# Patient Record
Sex: FEMALE | Employment: UNEMPLOYED | URBAN - METROPOLITAN AREA
[De-identification: names, ages, dates, MRNs, and addresses within clinical notes are randomized per-mention and may not be internally consistent; named-entity substitution may affect disease eponyms.]

---

## 2018-03-01 ENCOUNTER — OFFICE VISIT (OUTPATIENT)
Dept: FAMILY PLANNING/WOMEN'S HEALTH CLINIC | Age: 58
End: 2018-03-01

## 2018-03-01 ENCOUNTER — HOSPITAL ENCOUNTER (OUTPATIENT)
Dept: MAMMOGRAPHY | Age: 58
Discharge: HOME | End: 2018-03-01

## 2018-03-01 VITALS — DIASTOLIC BLOOD PRESSURE: 82 MMHG | SYSTOLIC BLOOD PRESSURE: 140 MMHG

## 2018-03-01 DIAGNOSIS — Z12.31 VISIT FOR SCREENING MAMMOGRAM: ICD-10-CM

## 2018-03-01 DIAGNOSIS — Z01.419 ENCOUNTER FOR WELL WOMAN EXAM: Primary | ICD-10-CM

## 2018-03-01 PROCEDURE — 77067 SCR MAMMO BI INCL CAD: CPT

## 2018-03-01 RX ORDER — SUMATRIPTAN 100 MG/1
100 TABLET, FILM COATED ORAL
COMMUNITY

## 2018-03-01 RX ORDER — NORTRIPTYLINE HYDROCHLORIDE 25 MG/1
CAPSULE ORAL
COMMUNITY

## 2018-03-01 NOTE — PROGRESS NOTES
Assessment/Plan:    Diagnoses and all orders for this visit:    1. Encounter for well woman exam        Follow-up Disposition: Not on File    [address] KISHA Hyman expressed understanding of this plan. An AVS was printed and given to the patient.      ----------------------------------------------------------------------    Chief Complaint   Patient presents with    Well Woman     EWL visit-breast only       History of Present Illness:     Hysterectomy in  for heavy menstrual bleeding  One pap since her hysterectomy  Last mammo? One in our system from   No breast concerns  She does SBE  She denies any risk for DV        No past medical history on file. Current Outpatient Prescriptions   Medication Sig Dispense Refill    nortriptyline (PAMELOR) 25 mg capsule Take  by mouth nightly.  SUMAtriptan (IMITREX) 100 mg tablet Take 100 mg by mouth once as needed for Migraine. No Known Allergies    Social History   Substance Use Topics    Smoking status: Current Every Day Smoker    Smokeless tobacco: Never Used    Alcohol use Not on file       No family history on file.     Physical Exam:     Visit Vitals    /82       A&Ox3  WDWN NAD  Respirations normal and non labored  Breast exam- wil neg for mass, dimpling, retractions, skin color changes, nipple changes or tenderness

## 2018-03-20 ENCOUNTER — HOSPITAL ENCOUNTER (OUTPATIENT)
Dept: MAMMOGRAPHY | Age: 58
Discharge: HOME | End: 2018-03-20

## 2018-03-20 DIAGNOSIS — R92.8 ABNORMAL MAMMOGRAM: ICD-10-CM

## 2018-03-20 PROCEDURE — 77065 DX MAMMO INCL CAD UNI: CPT

## 2018-03-29 ENCOUNTER — OFFICE VISIT (OUTPATIENT)
Dept: SURGERY | Age: 58
End: 2018-03-29

## 2018-03-29 ENCOUNTER — HOSPITAL ENCOUNTER (OUTPATIENT)
Dept: LAB | Age: 58
Discharge: HOME | End: 2018-03-29

## 2018-03-29 ENCOUNTER — DOCUMENTATION ONLY (OUTPATIENT)
Dept: SURGERY | Age: 58
End: 2018-03-29

## 2018-03-29 ENCOUNTER — HOSPITAL ENCOUNTER (OUTPATIENT)
Dept: MAMMOGRAPHY | Age: 58
Discharge: HOME | End: 2018-03-29
Attending: SURGERY

## 2018-03-29 VITALS
HEART RATE: 91 BPM | HEIGHT: 60 IN | BODY MASS INDEX: 29.84 KG/M2 | DIASTOLIC BLOOD PRESSURE: 88 MMHG | SYSTOLIC BLOOD PRESSURE: 145 MMHG | WEIGHT: 152 LBS

## 2018-03-29 DIAGNOSIS — R92.8 ABNORMAL MAMMOGRAM: ICD-10-CM

## 2018-03-29 DIAGNOSIS — R92.1 BREAST CALCIFICATIONS: Primary | ICD-10-CM

## 2018-03-29 DIAGNOSIS — R92.1 BREAST CALCIFICATIONS: ICD-10-CM

## 2018-03-29 PROCEDURE — 77030030538 MAM STEREO VAC  BX BREAST RT 1ST LESION W/CLIP AND SPECIMEN

## 2018-03-29 PROCEDURE — 77065 DX MAMMO INCL CAD UNI: CPT

## 2018-03-29 NOTE — MR AVS SNAPSHOT
[facility] [address] [place] [address] [facility] 
[phone] Patient: Kiran Mann MRN: [id number] KDU:[id number] Visit Information Date & Time Provider Department Dept. Phone Encounter #  
 3/29/2018  9:30 AM Nixon Doshi MD [facility] [phone] [id number] Upcoming Health Maintenance Date Due Hepatitis C Screening 1960 Pneumococcal 19-64 Medium Risk (1 of 1 - PPSV23) 3/25/1979 DTaP/Tdap/Td series (1 - Tdap) 3/25/1981 PAP AKA CERVICAL CYTOLOGY 3/25/1981 FOBT Q 1 YEAR AGE 50-75 3/25/2010 Influenza Age 5 to Adult 8/1/2017 BREAST CANCER SCRN MAMMOGRAM 3/20/2020 Allergies as of 3/29/2018  Review Complete On: 3/29/2018 By: Zayda Kothari RN No Known Allergies Current Immunizations  Never Reviewed No immunizations on file. Not reviewed this visit You Were Diagnosed With   
  
 Codes Comments Breast calcifications    -  Primary ICD-10-CM: R92.1 ICD-9-CM: 793.89 Abnormal mammogram     ICD-10-CM: R92.8 ICD-9-CM: 793.80 Vitals BP Pulse Height(growth percentile) Weight(growth percentile) BMI OB Status 145/88 91 5' (1.524 m) 152 lb (68.9 kg) 29.69 kg/m2 Hysterectomy Smoking Status Current Every Day Smoker BMI and BSA Data Body Mass Index Body Surface Area  
 29.69 kg/m 2 1.71 m 2 Your Updated Medication List  
  
   
This list is accurate as of 3/29/18  3:35 PM.  Always use your most recent med list.  
  
  
  
  
 IMITREX 100 mg tablet Generic drug:  SUMAtriptan Take 100 mg by mouth once as needed for Migraine. nortriptyline 25 mg capsule Commonly known as:  PAMELOR Take  by mouth nightly. We Performed the Following SURGICAL PATHOLOGY [KOF7073 Custom] To-Do List   
 03/29/2018   Imaging:  ELIZABETH STEREO VAC  BX BREAST RT 1ST LESION W/CLIP AND SPECIMEN   
  
  
 Patient Instructions Stereotactic Breast Biopsy: About This Test 
What is it? Stereotactic breast biopsy is a test that uses imaging, such as X-ray, to find an area of your breast where a tissue sample will be taken. The sample is looked at under a microscope to check for signs of breast cancer. Why is this test done? This type of breast biopsy is usually done to check for cancer in a lump found during a mammogram. 
How can you prepare for the test? 
Talk to your doctor about all your health conditions before the test. For example, tell your doctor if you: · Are taking any medicines. · Are allergic to any medicines. · Are allergic to latex. · Have had bleeding problems, or if you take aspirin or some other blood thinner. · Are or might be pregnant. · Have a problem with lying on your stomach for 30 to 40 minutes. What happens before the test? 
· You will take off your clothing above the waist. A paper or cloth gown will cover your shoulders. · Your skin is washed with a special soap. · You will be given a shot of medicine to numb the biopsy area on your breast. 
· Images are taken to find the exact site for the biopsy. What happens during the test? 
· You will lie on your stomach on a table that has a hole for your breast to hang through. · Once the area in your breast is numb, a small cut (incision) is made in the skin. · Using the imaging, the doctor will guide the needle into the biopsy area. · A sample of breast tissue is taken through the needle. · A small clip is usually inserted into your breast to spenser the biopsy site. · The needle is removed and pressure put on the needle site to stop any bleeding. · A bandage is put on the needle site. What else should you know about the test? 
· The small cut for the needle does not usually need stitches. How long does the test take? · The test will take about 60 minutes.  Most of the time is spent preparing for the images and finding the area for the biopsy. What happens after the test? 
· You'll be told how long it may take to get your results back. · You will probably be able to go home right away. · You can go back to your usual activities right away, but avoid heavy lifting for 24 hours. · The site may be tender for 2 or 3 days. You may also have some bruising, swelling, or slight bleeding. ¨ You can use an ice pack. Put ice or a cold pack on the area for 10 to 20 minutes at a time. Put a thin cloth between the ice and your skin. ¨ Ask your doctor if you can take an over-the-counter pain medicine, such as acetaminophen (Tylenol), ibuprofen (Advil, Motrin), or naproxen (Aleve). Be safe with medicines. Read and follow all instructions on the label. · After a specialist looks at the biopsy sample for signs of cancer, your doctor's office will let you know the results. · If the test results are not clear, you may have another biopsy or test. 
Follow-up care is a key part of your treatment and safety. Be sure to make and go to all appointments, and call your doctor if you are having problems. It's also a good idea to keep a list of the medicines you take. Ask your doctor when you can expect to have your test results. Where can you learn more? Go to http://cyril-alina.info/. Enter Y286 in the search box to learn more about \"Stereotactic Breast Biopsy: About This Test.\" Current as of: May 12, 2017 Content Version: 11.4 © 0175-8469 [employer]. Care instructions adapted under license by [employer] (which disclaims liability or warranty for this information). If you have questions about a medical condition or this instruction, always ask your healthcare professional. [address] any warranty or liability for your use of this information. Stereotactic Breast Biopsy: About This Test 
What is it? Stereotactic breast biopsy is a test that uses imaging, such as X-ray, to find an area of your breast where a tissue sample will be taken. The sample is looked at under a microscope to check for signs of breast cancer. Why is this test done? This type of breast biopsy is usually done to check for cancer in a lump found during a mammogram. 
How can you prepare for the test? 
Talk to your doctor about all your health conditions before the test. For example, tell your doctor if you: · Are taking any medicines. · Are allergic to any medicines. · Are allergic to latex. · Have had bleeding problems, or if you take aspirin or some other blood thinner. · Are or might be pregnant. · Have a problem with lying on your stomach for 30 to 40 minutes. What happens before the test? 
· You will take off your clothing above the waist. A paper or cloth gown will cover your shoulders. · Your skin is washed with a special soap. · You will be given a shot of medicine to numb the biopsy area on your breast. 
· Images are taken to find the exact site for the biopsy. What happens during the test? 
· You will lie on your stomach on a table that has a hole for your breast to hang through. · Once the area in your breast is numb, a small cut (incision) is made in the skin. · Using the imaging, the doctor will guide the needle into the biopsy area. · A sample of breast tissue is taken through the needle. · A small clip is usually inserted into your breast to spenser the biopsy site. · The needle is removed and pressure put on the needle site to stop any bleeding. · A bandage is put on the needle site. What else should you know about the test? 
· The small cut for the needle does not usually need stitches. How long does the test take? · The test will take about 60 minutes. Most of the time is spent preparing for the images and finding the area for the biopsy.  
What happens after the test? 
 · You'll be told how long it may take to get your results back. · You will probably be able to go home right away. · You can go back to your usual activities right away, but avoid heavy lifting for 24 hours. · The site may be tender for 2 or 3 days. You may also have some bruising, swelling, or slight bleeding. ¨ You can use an ice pack. Put ice or a cold pack on the area for 10 to 20 minutes at a time. Put a thin cloth between the ice and your skin. ¨ Ask your doctor if you can take an over-the-counter pain medicine, such as acetaminophen (Tylenol), ibuprofen (Advil, Motrin), or naproxen (Aleve). Be safe with medicines. Read and follow all instructions on the label. · After a specialist looks at the biopsy sample for signs of cancer, your doctor's office will let you know the results. · If the test results are not clear, you may have another biopsy or test. 
Follow-up care is a key part of your treatment and safety. Be sure to make and go to all appointments, and call your doctor if you are having problems. It's also a good idea to keep a list of the medicines you take. Ask your doctor when you can expect to have your test results. Where can you learn more? Go to http://cyril-alina.info/. Enter Y286 in the search box to learn more about \"Stereotactic Breast Biopsy: About This Test.\" Current as of: May 12, 2017 Content Version: 11.4 © 2331-9300 [employer]. Care instructions adapted under license by [employer] (which disclaims liability or warranty for this information). If you have questions about a medical condition or this instruction, always ask your healthcare professional. [place] [address] any warranty or liability for your use of this information. Introducing [facility]!    
 [facility] [email or web address] introduces Weplay patient portal. Now you can access parts of your medical record, email your doctor's office, and request medication refills online. 1. In your internet browser, go to [email or web address] 2. Click on the First Time User? Click Here link in the Sign In box. You will see the New Member Sign Up page. 3. Enter your Gnzo Access Code exactly as it appears below. You will not need to use this code after youve completed the sign-up process. If you do not sign up before the expiration date, you must request a new code. · Gnzo Access Code: [email or web address] Expires: 5/30/2018 12:12 PM 
 
4. Enter the last four digits of your Social Security Number (xxxx) and Date of Birth (mm/dd/yyyy) as indicated and click Submit. You will be taken to the next sign-up page. 5. Create a Gnzo ID. This will be your Gnzo login ID and cannot be changed, so think of one that is secure and easy to remember. 6. Create a Gnzo password. You can change your password at any time. 7. Enter your Password Reset Question and Answer. This can be used at a later time if you forget your password. 8. Enter your e-mail address. You will receive e-mail notification when new information is available in [address] 9. Click Sign Up. You can now view and download portions of your medical record. 10. Click the Download Summary menu link to download a portable copy of your medical information. If you have questions, please visit the Frequently Asked Questions section of the Gnzo website. Remember, Gnzo is NOT to be used for urgent needs. For medical emergencies, dial 911. Now available from your iPhone and Android! Please provide this summary of care documentation to your next provider. Your primary care clinician is listed as Selvin Del Toro. If you have any questions after today's visit, please call [phone].

## 2018-03-29 NOTE — PROGRESS NOTES
HISTORY OF PRESENT ILLNESS  Yumiko Montero is a 62 y.o. female. HPI  NEW patient consult referred by Dr. Beatrice Camarillo for abnormal RIGHT breast mammogram.  Denies palpable lump, skin changes, or nipple discharge/retraction. Has some pain to her RIGHT breast that comes and goes. Does take care of her grandchildren so not sure if it from them jumping on her. Obstetric History       T0      L3     SAB0   TAB0   Ectopic0   Molar0   Multiple0   Live Births0    Obstetric Comments   Menarche:  10. LMP: 39.  # of Children:  3. Age at Delivery of First Child:  16.   Hysterectomy/oophorectomy:  YES/NO. Breast Bx:  no.  Hx of Breast Feeding:  no. BCP:  no. Hormone therapy:  no.      FH:  Mother is a survivor of breast cancer, diagnosed at 66. Sister is a survivor of breast cancer, diagnosed at 39. Pt has one unaffected sister. Maternal first cousin is a patient of Dr. Wanda Keating, 61, having treatment now. Mammogram, 3/20/18, BIRADS 4B    Review of Systems   Constitutional: Negative. HENT: Negative. Eyes: Negative. Respiratory: Positive for cough. Cardiovascular: Negative. Gastrointestinal: Positive for diarrhea. Genitourinary: Negative. Musculoskeletal: Negative. Skin: Negative. Neurological: Negative. Endo/Heme/Allergies: Negative. Psychiatric/Behavioral: Negative. Physical Exam   Constitutional: She is oriented to person, place, and time. She appears well-developed and well-nourished. Cardiovascular: Normal rate, regular rhythm and normal heart sounds. Pulmonary/Chest: Effort normal and breath sounds normal. Right breast exhibits no mass, no nipple discharge and no skin change. Left breast exhibits no mass, no nipple discharge and no skin change. Breasts are symmetrical.   Lymphadenopathy:     She has no cervical adenopathy. She has no axillary adenopathy. Right: No supraclavicular adenopathy present.         Left: No supraclavicular adenopathy present. Neurological: She is alert and oriented to person, place, and time. Skin: Skin is warm and dry. Stereotactic Biopsy  PROCEDURE : LORAD STEREOTACTIC VACUUM ASSISTED BIOPSY AND RELATED DIGITAL MAMMOGRAPHY OF THE, Right BREAST. INDICATION : MICROCALCIFICATIONS. CONSENT : Following a detailed description of the LORAD stereotactic core biopsy procedure, its risks, benefits and possible alternatives (open biopsy), the patient signed the informed consent. The risks and benefits of the procedure have been explained to the patient by the stereotactic technologist and Dr. Gentry Zendejas. IMAGING : Prebiopsy digital stereotactic imaging of the breast was obtained and confirmed the presence of the abnormality in both  and stereotactic views. The Right breast was imaged. PROJECTION : CC above  Z 21mm/52mm. BIOPSY PROCEDURE : Following sterile preparation of the skin, 1% lidocaine was used as a local anesthetic. A 4mm skin incision was made for the entry site of the biopsy needle. Prefire stereotactic images were obtained to confirm accurate targeting. A 9 gauge Suros needle was used for the biopsy. 6 biopsy specimens were obtained. CLIP PLACEMENT : A clip was placed for future mammographic reference and position was confirmed with a 0 degree postfire image. SPECIMEN RADIOGRAPHY : Digital spot mammography of the core biopsy specimens demonstrated microcalcifications corresponding to the targeted calcifications. TOLERANCE:  Pt tolerated the procedure with no discomfort. FINAL PATHOLOGY :Right breast, core biopsy:       Fragments of benign breast tissue with intraductal and stromal  microcalcifications. POST BIOPSY MAMMOGRAM : CLIP IN GOOD POSITION. CONCORDANCE:  yes  ASSESSMENT and PLAN    ICD-10-CM ICD-9-CM    1. Breast calcifications R92.1 793.89    2.  Abnormal mammogram R92.8 793.80 ELIZABETH STEREO VAC  BX BREAST RT 1ST LESION W/CLIP AND SPECIMEN     RIGHT breast new microcalcifications biopsied    Called patient  Biopsy is benign  Resume yearly mammograms

## 2018-03-29 NOTE — PROGRESS NOTES
[facility]      Chart reviewed for the following:   Brisa Mcmanus MD , have reviewed the History, Physical and updated the Allergic reactions for Marley Lua performed immediately prior to start of procedure:   Brisa Mcmanus MD, have performed the following reviews on Cristiano Moffett prior to the start of the procedure:            * Patient was identified by name and date of birth   * Agreement on procedure being performed was verified  * Risks and Benefits explained to the patient  * Procedure site verified and marked as necessary  * Patient was positioned for comfort  * Consent was signed and verified     Time: 10:34am      Date of procedure: 3/29/2018    Procedure performed by:  Ulysses Gallus, MD    Provider assisted by: Gael Zapien RN    Patient assisted by: Gael Zapien RN    How tolerated by patient: Patient tolerated the procedure well without complications. Denies pain post biopsy    Post Procedural Pain Scale: 0/10    Comments: Written and verbal post biopsy instructions reviewed with and given to patient with her understanding.

## 2018-03-29 NOTE — PATIENT INSTRUCTIONS
Stereotactic Breast Biopsy: About This Test  What is it? Stereotactic breast biopsy is a test that uses imaging, such as X-ray, to find an area of your breast where a tissue sample will be taken. The sample is looked at under a microscope to check for signs of breast cancer. Why is this test done? This type of breast biopsy is usually done to check for cancer in a lump found during a mammogram.  How can you prepare for the test?  Talk to your doctor about all your health conditions before the test. For example, tell your doctor if you:  · Are taking any medicines. · Are allergic to any medicines. · Are allergic to latex. · Have had bleeding problems, or if you take aspirin or some other blood thinner. · Are or might be pregnant. · Have a problem with lying on your stomach for 30 to 40 minutes. What happens before the test?  · You will take off your clothing above the waist. A paper or cloth gown will cover your shoulders. · Your skin is washed with a special soap. · You will be given a shot of medicine to numb the biopsy area on your breast.  · Images are taken to find the exact site for the biopsy. What happens during the test?  · You will lie on your stomach on a table that has a hole for your breast to hang through. · Once the area in your breast is numb, a small cut (incision) is made in the skin. · Using the imaging, the doctor will guide the needle into the biopsy area. · A sample of breast tissue is taken through the needle. · A small clip is usually inserted into your breast to spenser the biopsy site. · The needle is removed and pressure put on the needle site to stop any bleeding. · A bandage is put on the needle site. What else should you know about the test?  · The small cut for the needle does not usually need stitches. How long does the test take? · The test will take about 60 minutes. Most of the time is spent preparing for the images and finding the area for the biopsy.   What happens after the test?  · You'll be told how long it may take to get your results back. · You will probably be able to go home right away. · You can go back to your usual activities right away, but avoid heavy lifting for 24 hours. · The site may be tender for 2 or 3 days. You may also have some bruising, swelling, or slight bleeding. ¨ You can use an ice pack. Put ice or a cold pack on the area for 10 to 20 minutes at a time. Put a thin cloth between the ice and your skin. ¨ Ask your doctor if you can take an over-the-counter pain medicine, such as acetaminophen (Tylenol), ibuprofen (Advil, Motrin), or naproxen (Aleve). Be safe with medicines. Read and follow all instructions on the label. · After a specialist looks at the biopsy sample for signs of cancer, your doctor's office will let you know the results. · If the test results are not clear, you may have another biopsy or test.  Follow-up care is a key part of your treatment and safety. Be sure to make and go to all appointments, and call your doctor if you are having problems. It's also a good idea to keep a list of the medicines you take. Ask your doctor when you can expect to have your test results. Where can you learn more? Go to http://cyril-alina.info/. Enter Y286 in the search box to learn more about \"Stereotactic Breast Biopsy: About This Test.\"  Current as of: May 12, 2017  Content Version: 11.4  © 5270-3629 [employer]. Care instructions adapted under license by [employer] (which disclaims liability or warranty for this information). If you have questions about a medical condition or this instruction, always ask your healthcare professional. [place] [address] any warranty or liability for your use of this information. Stereotactic Breast Biopsy: About This Test  What is it?   Stereotactic breast biopsy is a test that uses imaging, such as X-ray, to find an area of your breast where a tissue sample will be taken. The sample is looked at under a microscope to check for signs of breast cancer. Why is this test done? This type of breast biopsy is usually done to check for cancer in a lump found during a mammogram.  How can you prepare for the test?  Talk to your doctor about all your health conditions before the test. For example, tell your doctor if you:  · Are taking any medicines. · Are allergic to any medicines. · Are allergic to latex. · Have had bleeding problems, or if you take aspirin or some other blood thinner. · Are or might be pregnant. · Have a problem with lying on your stomach for 30 to 40 minutes. What happens before the test?  · You will take off your clothing above the waist. A paper or cloth gown will cover your shoulders. · Your skin is washed with a special soap. · You will be given a shot of medicine to numb the biopsy area on your breast.  · Images are taken to find the exact site for the biopsy. What happens during the test?  · You will lie on your stomach on a table that has a hole for your breast to hang through. · Once the area in your breast is numb, a small cut (incision) is made in the skin. · Using the imaging, the doctor will guide the needle into the biopsy area. · A sample of breast tissue is taken through the needle. · A small clip is usually inserted into your breast to spenser the biopsy site. · The needle is removed and pressure put on the needle site to stop any bleeding. · A bandage is put on the needle site. What else should you know about the test?  · The small cut for the needle does not usually need stitches. How long does the test take? · The test will take about 60 minutes. Most of the time is spent preparing for the images and finding the area for the biopsy. What happens after the test?  · You'll be told how long it may take to get your results back. · You will probably be able to go home right away.   · You can go back to your usual activities right away, but avoid heavy lifting for 24 hours. · The site may be tender for 2 or 3 days. You may also have some bruising, swelling, or slight bleeding. ¨ You can use an ice pack. Put ice or a cold pack on the area for 10 to 20 minutes at a time. Put a thin cloth between the ice and your skin. ¨ Ask your doctor if you can take an over-the-counter pain medicine, such as acetaminophen (Tylenol), ibuprofen (Advil, Motrin), or naproxen (Aleve). Be safe with medicines. Read and follow all instructions on the label. · After a specialist looks at the biopsy sample for signs of cancer, your doctor's office will let you know the results. · If the test results are not clear, you may have another biopsy or test.  Follow-up care is a key part of your treatment and safety. Be sure to make and go to all appointments, and call your doctor if you are having problems. It's also a good idea to keep a list of the medicines you take. Ask your doctor when you can expect to have your test results. Where can you learn more? Go to http://cyril-alina.info/. Enter Y286 in the search box to learn more about \"Stereotactic Breast Biopsy: About This Test.\"  Current as of: May 12, 2017  Content Version: 11.4  © 4766-4146 [employer]. Care instructions adapted under license by [email or web address] (which disclaims liability or warranty for this information). If you have questions about a medical condition or this instruction, always ask your healthcare professional. [place] [address] any warranty or liability for your use of this information.

## 2018-04-03 NOTE — COMMUNICATION BODY
HISTORY OF PRESENT ILLNESS  Kiran Mann is a 62 y.o. female. HPI  NEW patient consult referred by Dr. Jonathan Zhong for abnormal RIGHT breast mammogram.  Denies palpable lump, skin changes, or nipple discharge/retraction. Has some pain to her RIGHT breast that comes and goes. Does take care of her grandchildren so not sure if it from them jumping on her. Obstetric History       T0      L3     SAB0   TAB0   Ectopic0   Molar0   Multiple0   Live Births0    Obstetric Comments   Menarche:  10. LMP: 39.  # of Children:  3. Age at Delivery of First Child:  16.   Hysterectomy/oophorectomy:  YES/NO. Breast Bx:  no.  Hx of Breast Feeding:  no. BCP:  no. Hormone therapy:  no.      FH:  Mother is a survivor of breast cancer, diagnosed at 66. Sister is a survivor of breast cancer, diagnosed at 39. Pt has one unaffected sister. Maternal first cousin is a patient of Dr. Sarah Bean, 61, having treatment now. Mammogram, 3/20/18, BIRADS 4B    Review of Systems   Constitutional: Negative. HENT: Negative. Eyes: Negative. Respiratory: Positive for cough. Cardiovascular: Negative. Gastrointestinal: Positive for diarrhea. Genitourinary: Negative. Musculoskeletal: Negative. Skin: Negative. Neurological: Negative. Endo/Heme/Allergies: Negative. Psychiatric/Behavioral: Negative. Physical Exam   Constitutional: She is oriented to person, place, and time. She appears well-developed and well-nourished. Cardiovascular: Normal rate, regular rhythm and normal heart sounds. Pulmonary/Chest: Effort normal and breath sounds normal. Right breast exhibits no mass, no nipple discharge and no skin change. Left breast exhibits no mass, no nipple discharge and no skin change. Breasts are symmetrical.   Lymphadenopathy:     She has no cervical adenopathy. She has no axillary adenopathy. Right: No supraclavicular adenopathy present.         Left: No supraclavicular adenopathy present. Neurological: She is alert and oriented to person, place, and time. Skin: Skin is warm and dry. Stereotactic Biopsy  PROCEDURE : LORAD STEREOTACTIC VACUUM ASSISTED BIOPSY AND RELATED DIGITAL MAMMOGRAPHY OF THE, Right BREAST. INDICATION : MICROCALCIFICATIONS. CONSENT : Following a detailed description of the LORAD stereotactic core biopsy procedure, its risks, benefits and possible alternatives (open biopsy), the patient signed the informed consent. The risks and benefits of the procedure have been explained to the patient by the stereotactic technologist and Dr. Analisa Short. IMAGING : Prebiopsy digital stereotactic imaging of the breast was obtained and confirmed the presence of the abnormality in both  and stereotactic views. The Right breast was imaged. PROJECTION : CC above  Z 21mm/52mm. BIOPSY PROCEDURE : Following sterile preparation of the skin, 1% lidocaine was used as a local anesthetic. A 4mm skin incision was made for the entry site of the biopsy needle. Prefire stereotactic images were obtained to confirm accurate targeting. A 9 gauge Suros needle was used for the biopsy. 6 biopsy specimens were obtained. CLIP PLACEMENT : A clip was placed for future mammographic reference and position was confirmed with a 0 degree postfire image. SPECIMEN RADIOGRAPHY : Digital spot mammography of the core biopsy specimens demonstrated microcalcifications corresponding to the targeted calcifications. TOLERANCE:  Pt tolerated the procedure with no discomfort. FINAL PATHOLOGY :Right breast, core biopsy:       Fragments of benign breast tissue with intraductal and stromal  microcalcifications. POST BIOPSY MAMMOGRAM : CLIP IN GOOD POSITION. CONCORDANCE:  yes  ASSESSMENT and PLAN    ICD-10-CM ICD-9-CM    1. Breast calcifications R92.1 793.89    2.  Abnormal mammogram R92.8 793.80 ELIZABETH STEREO VAC  BX BREAST RT 1ST LESION W/CLIP AND SPECIMEN     RIGHT breast new microcalcifications biopsied    Called patient  Biopsy is benign  Resume yearly mammograms

## 2025-07-21 SDOH — HEALTH STABILITY: PHYSICAL HEALTH: ON AVERAGE, HOW MANY MINUTES DO YOU ENGAGE IN EXERCISE AT THIS LEVEL?: 30 MIN

## 2025-07-21 SDOH — HEALTH STABILITY: PHYSICAL HEALTH: ON AVERAGE, HOW MANY DAYS PER WEEK DO YOU ENGAGE IN MODERATE TO STRENUOUS EXERCISE (LIKE A BRISK WALK)?: 5 DAYS

## 2025-08-04 ENCOUNTER — OFFICE VISIT (OUTPATIENT)
Facility: CLINIC | Age: 65
End: 2025-08-04

## 2025-08-04 VITALS
BODY MASS INDEX: 23.16 KG/M2 | DIASTOLIC BLOOD PRESSURE: 66 MMHG | WEIGHT: 118 LBS | HEART RATE: 68 BPM | HEIGHT: 60 IN | SYSTOLIC BLOOD PRESSURE: 131 MMHG | TEMPERATURE: 98 F | RESPIRATION RATE: 16 BRPM | OXYGEN SATURATION: 99 %

## 2025-08-04 DIAGNOSIS — Z80.3 FAMILY HISTORY OF BREAST CANCER: ICD-10-CM

## 2025-08-04 DIAGNOSIS — G43.919 INTRACTABLE MIGRAINE WITHOUT STATUS MIGRAINOSUS, UNSPECIFIED MIGRAINE TYPE: ICD-10-CM

## 2025-08-04 DIAGNOSIS — Z87.891 PERSONAL HISTORY OF TOBACCO USE: ICD-10-CM

## 2025-08-04 DIAGNOSIS — F17.200 SMOKER: ICD-10-CM

## 2025-08-04 DIAGNOSIS — K58.0 IRRITABLE BOWEL SYNDROME WITH DIARRHEA: Primary | ICD-10-CM

## 2025-08-04 RX ORDER — LOPERAMIDE HYDROCHLORIDE 2 MG/1
2 CAPSULE ORAL PRN
COMMUNITY

## 2025-08-04 RX ORDER — SUMATRIPTAN SUCCINATE 100 MG/1
100 TABLET ORAL
COMMUNITY

## 2025-08-04 SDOH — ECONOMIC STABILITY: FOOD INSECURITY: WITHIN THE PAST 12 MONTHS, YOU WORRIED THAT YOUR FOOD WOULD RUN OUT BEFORE YOU GOT MONEY TO BUY MORE.: NEVER TRUE

## 2025-08-04 SDOH — ECONOMIC STABILITY: FOOD INSECURITY: WITHIN THE PAST 12 MONTHS, THE FOOD YOU BOUGHT JUST DIDN'T LAST AND YOU DIDN'T HAVE MONEY TO GET MORE.: NEVER TRUE

## 2025-08-04 SDOH — HEALTH STABILITY: PHYSICAL HEALTH: ON AVERAGE, HOW MANY MINUTES DO YOU ENGAGE IN EXERCISE AT THIS LEVEL?: 30 MIN

## 2025-08-04 SDOH — HEALTH STABILITY: PHYSICAL HEALTH: ON AVERAGE, HOW MANY DAYS PER WEEK DO YOU ENGAGE IN MODERATE TO STRENUOUS EXERCISE (LIKE A BRISK WALK)?: 5 DAYS

## 2025-08-04 ASSESSMENT — PATIENT HEALTH QUESTIONNAIRE - PHQ9
5. POOR APPETITE OR OVEREATING: NOT AT ALL
1. LITTLE INTEREST OR PLEASURE IN DOING THINGS: NOT AT ALL
SUM OF ALL RESPONSES TO PHQ QUESTIONS 1-9: 0
SUM OF ALL RESPONSES TO PHQ QUESTIONS 1-9: 0
3. TROUBLE FALLING OR STAYING ASLEEP: NOT AT ALL
6. FEELING BAD ABOUT YOURSELF - OR THAT YOU ARE A FAILURE OR HAVE LET YOURSELF OR YOUR FAMILY DOWN: NOT AT ALL
9. THOUGHTS THAT YOU WOULD BE BETTER OFF DEAD, OR OF HURTING YOURSELF: NOT AT ALL
SUM OF ALL RESPONSES TO PHQ QUESTIONS 1-9: 0
7. TROUBLE CONCENTRATING ON THINGS, SUCH AS READING THE NEWSPAPER OR WATCHING TELEVISION: NOT AT ALL
10. IF YOU CHECKED OFF ANY PROBLEMS, HOW DIFFICULT HAVE THESE PROBLEMS MADE IT FOR YOU TO DO YOUR WORK, TAKE CARE OF THINGS AT HOME, OR GET ALONG WITH OTHER PEOPLE: NOT DIFFICULT AT ALL
2. FEELING DOWN, DEPRESSED OR HOPELESS: NOT AT ALL
8. MOVING OR SPEAKING SO SLOWLY THAT OTHER PEOPLE COULD HAVE NOTICED. OR THE OPPOSITE, BEING SO FIGETY OR RESTLESS THAT YOU HAVE BEEN MOVING AROUND A LOT MORE THAN USUAL: NOT AT ALL
4. FEELING TIRED OR HAVING LITTLE ENERGY: NOT AT ALL
SUM OF ALL RESPONSES TO PHQ QUESTIONS 1-9: 0

## 2025-08-04 ASSESSMENT — ENCOUNTER SYMPTOMS
ROS SKIN COMMENTS: DIFFUSE BRUISING
RESPIRATORY NEGATIVE: 1
EYES NEGATIVE: 1
DIARRHEA: 1
ALLERGIC/IMMUNOLOGIC NEGATIVE: 1